# Patient Record
Sex: MALE | Race: WHITE | Employment: FULL TIME | URBAN - NONMETROPOLITAN AREA
[De-identification: names, ages, dates, MRNs, and addresses within clinical notes are randomized per-mention and may not be internally consistent; named-entity substitution may affect disease eponyms.]

---

## 2021-03-27 ENCOUNTER — HOSPITAL ENCOUNTER (EMERGENCY)
Age: 26
Discharge: HOME | End: 2021-03-27
Payer: COMMERCIAL

## 2021-03-27 VITALS
TEMPERATURE: 98.4 F | DIASTOLIC BLOOD PRESSURE: 62 MMHG | RESPIRATION RATE: 16 BRPM | HEART RATE: 70 BPM | OXYGEN SATURATION: 98 % | SYSTOLIC BLOOD PRESSURE: 140 MMHG | BODY MASS INDEX: 24.36 KG/M2 | HEIGHT: 76 IN | WEIGHT: 200 LBS

## 2021-03-27 DIAGNOSIS — F45.8 HYPERVENTILATION SYNDROME: ICD-10-CM

## 2021-03-27 DIAGNOSIS — R42 DIZZINESS: Primary | ICD-10-CM

## 2021-03-27 LAB
EKG ATRIAL RATE: 68 BPM
EKG P AXIS: 69 DEGREES
EKG P-R INTERVAL: 174 MS
EKG Q-T INTERVAL: 380 MS
EKG QRS DURATION: 106 MS
EKG QTC CALCULATION (BAZETT): 404 MS
EKG R AXIS: 79 DEGREES
EKG T AXIS: 67 DEGREES
EKG VENTRICULAR RATE: 68 BPM

## 2021-03-27 PROCEDURE — 99203 OFFICE O/P NEW LOW 30 MIN: CPT

## 2021-03-27 PROCEDURE — 93005 ELECTROCARDIOGRAM TRACING: CPT | Performed by: NURSE PRACTITIONER

## 2021-03-27 PROCEDURE — 99214 OFFICE O/P EST MOD 30 MIN: CPT | Performed by: NURSE PRACTITIONER

## 2021-03-27 ASSESSMENT — ENCOUNTER SYMPTOMS
BACK PAIN: 0
SORE THROAT: 0
ALLERGIC/IMMUNOLOGIC NEGATIVE: 1
ABDOMINAL PAIN: 0
CONSTIPATION: 0
EYE PAIN: 0
EYE REDNESS: 0
RHINORRHEA: 0
NAUSEA: 0
WHEEZING: 0
SHORTNESS OF BREATH: 1
VOMITING: 0
DIARRHEA: 0
COUGH: 0
EYE DISCHARGE: 0
TROUBLE SWALLOWING: 0

## 2021-03-27 NOTE — ED NOTES
No change in patients condition. Discharge instructions discussed with pt and pt verbalized understanding of info given. Pt left stable and ambulated to exit.        Melonie Hein RN  03/27/21 8458

## 2021-03-27 NOTE — ED PROVIDER NOTES
[address]  Urgent Care Encounter      CHIEF COMPLAINT       Chief Complaint   Patient presents with    Panic Attack    Dizziness       Nurses Notes reviewed and I agree except as noted in the HPI. HISTORY OF PRESENT ILLNESS   Sheri Durand is a 31 y.o. male who presents with complaint of episode today of sudden onset of right chest pain that was followed by a sensation of shortness of breath, then followed by both of his hands becoming numb and loss of feeling in his fingertips. States he then began to get dizzy and was sweating profusely while he was in a car with his wife. They then presented to the urgent care where his symptoms somewhat rapidly abated. REVIEW OF SYSTEMS     Review of Systems   Constitutional: Positive for diaphoresis. Negative for activity change, fatigue and fever. HENT: Negative for congestion, ear pain, rhinorrhea, sore throat and trouble swallowing. Eyes: Negative for pain, discharge and redness. Respiratory: Positive for shortness of breath. Negative for cough and wheezing. Cardiovascular: Positive for chest pain. Gastrointestinal: Negative for abdominal pain, constipation, diarrhea, nausea and vomiting. Endocrine: Negative. Genitourinary: Negative for dysuria, frequency and urgency. Musculoskeletal: Negative for arthralgias, back pain and myalgias. Skin: Negative for rash. Allergic/Immunologic: Negative. Neurological: Positive for dizziness. Negative for tremors, weakness and headaches. Hematological: Negative. Psychiatric/Behavioral: Negative for dysphoric mood and sleep disturbance. The patient is not nervous/anxious. PAST MEDICAL HISTORY   History reviewed. No pertinent past medical history. SURGICAL HISTORY     Patient  has no past surgical history on file. CURRENT MEDICATIONS       There are no discharge medications for this patient. ALLERGIES     Patient is has No Known Allergies.     FAMILY HISTORY Patient'sfamily history is not on file. SOCIAL HISTORY     Patient  reports that he has been smoking cigarettes. He has been smoking about 1.00 pack per day. He does not have any smokeless tobacco history on file. He reports current alcohol use. He reports that he does not use drugs. PHYSICAL EXAM     ED TRIAGE VITALS  BP: (!) 140/62, Temp: 98.4 °F (36.9 °C), Pulse: 70, Resp: 16, SpO2: 98 %  Physical Exam  Vitals signs reviewed. Constitutional:       General: He is not in acute distress. Appearance: Normal appearance. He is well-developed. He is not toxic-appearing or diaphoretic. HENT:      Head: Normocephalic. No right periorbital erythema or left periorbital erythema. Jaw: No trismus. Right Ear: Hearing and external ear normal.      Left Ear: Hearing and external ear normal.      Nose: Nose normal. No mucosal edema or rhinorrhea. Mouth/Throat:      Mouth: Mucous membranes are moist.      Dentition: Normal dentition. Pharynx: Uvula midline. No posterior oropharyngeal erythema. Tonsils: No tonsillar exudate. 0 on the right. 0 on the left. Eyes:      General: Lids are normal.         Right eye: No discharge. Left eye: No discharge. Conjunctiva/sclera: Conjunctivae normal.      Right eye: Right conjunctiva is not injected. No chemosis. Left eye: No chemosis. Pupils: Pupils are equal, round, and reactive to light. Neck:      Musculoskeletal: Full passive range of motion without pain and normal range of motion. Vascular: No JVD. Trachea: Trachea normal.   Cardiovascular:      Rate and Rhythm: Normal rate and regular rhythm. Heart sounds: Normal heart sounds. No murmur. Pulmonary:      Effort: Pulmonary effort is normal. No respiratory distress. Breath sounds: Normal breath sounds. No stridor. No wheezing. Abdominal:      Tenderness: There is abdominal tenderness. Musculoskeletal: Normal range of motion.          General: No tenderness or signs of injury. Lymphadenopathy:      Cervical: No cervical adenopathy. Skin:     General: Skin is warm and dry. Capillary Refill: Capillary refill takes less than 2 seconds. Findings: No rash. Neurological:      Mental Status: He is alert and oriented to person, place, and time. GCS: GCS eye subscore is 4. GCS verbal subscore is 5. GCS motor subscore is 6. Cranial Nerves: No cranial nerve deficit. Coordination: Coordination normal.      Gait: Gait normal.   Psychiatric:         Mood and Affect: Mood is not anxious or depressed. Speech: Speech normal.         Behavior: Behavior normal. Behavior is not withdrawn or hyperactive. Behavior is cooperative. Thought Content: Thought content normal.         Judgment: Judgment normal.         DIAGNOSTIC RESULTS   Labs: No results found for this visit on 03/27/21. IMAGING:    URGENT CARE COURSE:     Vitals:    03/27/21 1705   BP: (!) 140/62   Pulse: 70   Resp: 16   Temp: 98.4 °F (36.9 °C)   SpO2: 98%   Weight: 200 lb (90.7 kg)   Height: 6' 4\" (1.93 m)     Patient is asymptomatic during his exam which is benign. EKG is normal.    Medications - No data to display  PROCEDURES:  None  FINAL IMPRESSION      1. Dizziness    2. Hyperventilation syndrome        DISPOSITION/PLAN   DISPOSITION Decision To Discharge 03/27/2021 05:25:19 PM    A review of the patient's chart was completed for the last couple of years to see he has been worked up for palpitations and had several different tests, with no significant diagnoses or outcomes. I discussed with the patient that he most certainly has some hyperventilatory symptoms with the numbness in his hands and explained how this transpires in the context of chest pain, and/or anxiety. Patient was encouraged to follow-up with his PCP and if the symptoms return and are worse he can go to the emergency department.     PATIENT REFERRED TO:  Dharmesh Portillo MD  [address], [address]  [facility] [address]  [phone]      As needed    [facility] EMERGENCY DEPT  [address]  [address] [facility]  [phone]    If symptoms worsen    DISCHARGE MEDICATIONS:  There are no discharge medications for this patient. There are no discharge medications for this patient.       VIKI Del Valle CNP, APRN - CNP  03/27/21 1738